# Patient Record
Sex: FEMALE | Race: OTHER | NOT HISPANIC OR LATINO
[De-identification: names, ages, dates, MRNs, and addresses within clinical notes are randomized per-mention and may not be internally consistent; named-entity substitution may affect disease eponyms.]

---

## 2020-06-16 PROBLEM — Z00.00 ENCOUNTER FOR PREVENTIVE HEALTH EXAMINATION: Status: ACTIVE | Noted: 2020-06-16

## 2021-04-01 ENCOUNTER — APPOINTMENT (OUTPATIENT)
Dept: PAIN MANAGEMENT | Facility: CLINIC | Age: 56
End: 2021-04-01
Payer: MEDICAID

## 2021-04-01 VITALS
DIASTOLIC BLOOD PRESSURE: 80 MMHG | HEIGHT: 72 IN | WEIGHT: 126 LBS | BODY MASS INDEX: 17.07 KG/M2 | SYSTOLIC BLOOD PRESSURE: 122 MMHG | TEMPERATURE: 98.2 F

## 2021-04-01 DIAGNOSIS — Z78.9 OTHER SPECIFIED HEALTH STATUS: ICD-10-CM

## 2021-04-01 PROCEDURE — 99244 OFF/OP CNSLTJ NEW/EST MOD 40: CPT

## 2021-04-01 PROCEDURE — 99072 ADDL SUPL MATRL&STAF TM PHE: CPT

## 2021-04-01 NOTE — CONSULT LETTER
[Dear  ___] : Dear  [unfilled], [Consult Letter:] : I had the pleasure of evaluating your patient, [unfilled]. [Please see my note below.] : Please see my note below. [Consult Closing:] : Thank you very much for allowing me to participate in the care of this patient.  If you have any questions, please do not hesitate to contact me. [Sincerely,] : Sincerely, [FreeTextEntry3] : Andrew Meza MD\par

## 2021-04-01 NOTE — PHYSICAL EXAM
[de-identified] : Constitutional: Well-developed, in no acute distress\par Eyes: Pupil- Right: normal, Left: normal\par Neck exam: Inspection normal\par Respiratory: Normal effort, speaking in full sentences\par Cardiovascular: Regular rate and rhythm\par Vascular: Dorsal pedis pulses normal and equal bilaterally\par Abdomen: Inspection normal, no distension\par Skin: Inspection normal, no bruising noted\par Musculoskeletal:\par Lumbar Spine:   Gait: Antalgic\par 		Inspection: Normal curvature, no abnormal kyphosis or scoliosis\par 		Facet loading: pain bilaterally\par 		Palpation:\par 			Lumbar and paraspinal muscles: pain bilaterally\par 			Sacroiliac joint: no pain\par 			Greater trochanter: no pain\par 		Muscle Strength:\par 		Iliopsoas: 5/5 bilaterally\par 		Quadriceps: 5/5 bilaterally\par 		Hamstrings: 5/5 bilaterally\par 		Tibialis anterior: 5/5 bilaterally\par 		Extensor hallucis longus: 5/5 bilaterally\par \par 		Sensation: normal and equal in bilateral lower extremities\par \par 		Reflexes:\par 			Patellar reflex: 2+ bilaterally\par 			Ankle jerk reflex: 2+ bilaterally\par 		\par 		Straight leg raise: negative bilaterally\par \par Extremity: no edema noted\par Neurological: Memory normal, AAO x 3, Cranial nerves II - XII grossly normal\par Psychiatric: Appropriate mood and affect, oriented to time, place, person, and situation\par \par \par

## 2021-04-01 NOTE — HISTORY OF PRESENT ILLNESS
[___ yrs] : [unfilled] year(s) ago [Constant] : constant [10] : a maximum pain level of 10/10 [Dull] : dull [Throbbing] : throbbing [Burning] : burning [Shooting] : shooting [Rest] : rest [FreeTextEntry1] : 56 yof presents w/ three  years of right low back pain which is worsening. She initially thought it was her hip. Now she has pain radiating down her right leg posteriorly to the foot. Left leg is wnl. Has right-sided low back pain. Was told that she needed hip surgery.  [FreeTextEntry7] : Right leg pain  [de-identified] : cramps [FreeTextEntry3] : n/a [FreeTextEntry4] : n

## 2021-04-01 NOTE — ASSESSMENT
[FreeTextEntry1] : 56 yof presents w/ three  years of right low back pain which is worsening. She initially thought it was her hip. Now she has pain radiating down her right leg posteriorly to the foot. Left leg is wnl. Has right-sided low back pain. Was told that she needed hip surgery. The patient has failed to have relief with over six weeks of physical therapy within the last three months and all medications. GIven their failure to improve with all other conservative measures recommend MRI lumbar spine. Patient will return to review imaging and plan for potential intervention.\par

## 2021-04-15 ENCOUNTER — RESULT REVIEW (OUTPATIENT)
Age: 56
End: 2021-04-15

## 2021-04-21 ENCOUNTER — APPOINTMENT (OUTPATIENT)
Dept: PAIN MANAGEMENT | Facility: CLINIC | Age: 56
End: 2021-04-21
Payer: MEDICAID

## 2021-04-21 VITALS
TEMPERATURE: 98 F | WEIGHT: 126 LBS | SYSTOLIC BLOOD PRESSURE: 126 MMHG | HEIGHT: 72 IN | DIASTOLIC BLOOD PRESSURE: 80 MMHG | BODY MASS INDEX: 17.07 KG/M2

## 2021-04-21 PROCEDURE — 99072 ADDL SUPL MATRL&STAF TM PHE: CPT

## 2021-04-21 PROCEDURE — 99214 OFFICE O/P EST MOD 30 MIN: CPT

## 2021-04-21 NOTE — DATA REVIEWED
[FreeTextEntry1] : \par  [place] MRI Report             Final\par \par No Documents Attached\par \par \par \par \par   [facility]\par                                          [address]\par                                    [place], [place]  [address]\par                                        Department of Radiology\par                                             [phone]\par \par \par Patient Name:      CHARMAINE REZA                Location:       [facility]\par Med Rec #:        [id number]                    Account #:      [id number]\par YOB: 1965                    Ordering:       Andrew Meza MD\par Age: 56               Sex:    F                 Attending:      Andrew Meza MD\par PCP:        NO PRIMARY CARE PHYSICIAN\par ______________________________________________________________________________________\par \par Exam Date:      04/15/21\par Exam:         MRI LUMBAR SPINE\par Order#:       [id number]\par \par \par \par HISTORY: 56 years Female LUMBAR RADICULOPATHY MRI\par \par \par  PROCEDURE:MRI lumbar spine without contrast\par \par  COMPARISON:None\par \par  TECHNIQUE:MRI lumbosacral spine 1.5 Anne magnet\par \par  FINDINGS:\par \par  The paraspinal musculature including the psoas muscle, multifidus muscles, and immediate para axial\par soft tissues appear within range of normal without evidence of mass or collection.\par \par  There is a maintenance of the normal lumbar lordosis. There is fairly uniform marrow signal on all\par sequences.\par \par  The spinal canal is patent without detectable intradural or extradural mass or collection.\par \par  At L5-S1\par  The disc appears intact. There is no significant central or foraminal stenosis. Bony productive\par changes associated with facet arthropathy are present.\par \par  At L4-L5\par  There is a left foraminal disc protrusion contacting the exiting left L4 nerve root without\par evidence of nerve root compression. There is mass effect on the traversing left L5 nerve root. Mild\par multifactorial central canal stenosis is predominantly secondary to ligamentum flavum\par thickening/buckling and bony overgrowth associated with facet arthropathy.\par \par  At L3-L4\par  There is mild multifactorial central canal stenosis predominantly secondary to bony overgrowth of\par the adjacent facet joints and ligamentum flavum thickening/buckling. Circumferential bulging of the\par outer annular fibers is present with a shallow central bulge\par \par  At L2-L3\par  There is mild disc desiccation. No focal disc protrusion is evident. Bony productive changes\par associated with facet arthropathy contributes to mild central canal stenosis.\par \par  At L1-L2\par  The disc appears intact. There is no significant central or foraminal stenosis.\par \par \par \par \par  IMPRESSION:\par \par  Left foraminal disc protrusion at L4-L5\par \par  Broad-based disc bulging at L3-L4 mild in degree\par \par  Bony productive changes and thickening of the ligamentum flavum contributes to varying degrees of\par central canal stenosis most notable at L4-5 where there is mild to moderate central canal stenosis\par and at other levels where there is mild central canal stenosis as further detailed above.

## 2021-04-21 NOTE — ASSESSMENT
[FreeTextEntry1] : 56 yof presents w/ three  years of right low back pain which is worsening. Now she has pain radiating down her right leg posteriorly to the foot. Left leg is wnl. Has right-sided low back pain. I have personally reviewed the patient's MRI in detail and discussed it with them which is significant for some nerve root impingement. Medrol dose karthik for acute pain before her travels. If no relief upon returning plan for LEATHA. \par

## 2021-04-21 NOTE — PHYSICAL EXAM
[de-identified] : Constitutional: Well-developed, in no acute distress\par \par Skin: Inspection normal, no bruising noted\par Musculoskeletal:\par Lumbar Spine:   Gait: Antalgic\par 		Inspection: Normal curvature, no abnormal kyphosis or scoliosis\par 		Facet loading: pain bilaterally\par 		Palpation:\par 			Lumbar and paraspinal muscles: pain bilaterally\par 			Sacroiliac joint: no pain\par 			Greater trochanter: no pain\par 		Muscle Strength:\par 		Iliopsoas: 5/5 bilaterally\par 		Quadriceps: 5/5 bilaterally\par 		Hamstrings: 5/5 bilaterally\par 		Tibialis anterior: 5/5 bilaterally\par 		Extensor hallucis longus: 5/5 bilaterally\par \par 		Sensation: normal and equal in bilateral lower extremities\par \par 		Reflexes:\par 			Patellar reflex: 2+ bilaterally\par 			Ankle jerk reflex: 2+ bilaterally\par 		\par 		Straight leg raise: negative bilaterally\par \par Extremity: no edema noted\par Neurological: Memory normal, AAO x 3, Cranial nerves II - XII grossly normal\par Psychiatric: Appropriate mood and affect, oriented to time, place, person, and situation\par \par \par

## 2021-05-20 ENCOUNTER — APPOINTMENT (OUTPATIENT)
Dept: PAIN MANAGEMENT | Facility: CLINIC | Age: 56
End: 2021-05-20
Payer: MEDICAID

## 2021-05-20 PROCEDURE — 99443: CPT

## 2021-06-01 ENCOUNTER — APPOINTMENT (OUTPATIENT)
Dept: PAIN MANAGEMENT | Facility: HOSPITAL | Age: 56
End: 2021-06-01

## 2021-06-01 ENCOUNTER — RESULT REVIEW (OUTPATIENT)
Age: 56
End: 2021-06-01

## 2021-10-29 ENCOUNTER — APPOINTMENT (OUTPATIENT)
Dept: PAIN MANAGEMENT | Facility: CLINIC | Age: 56
End: 2021-10-29
Payer: MEDICAID

## 2021-10-29 DIAGNOSIS — Z87.39 PERSONAL HISTORY OF OTHER DISEASES OF THE MUSCULOSKELETAL SYSTEM AND CONNECTIVE TISSUE: ICD-10-CM

## 2021-10-29 PROCEDURE — 99214 OFFICE O/P EST MOD 30 MIN: CPT

## 2021-10-29 PROCEDURE — 99072 ADDL SUPL MATRL&STAF TM PHE: CPT

## 2021-10-29 NOTE — DATA REVIEWED
[FreeTextEntry1] : 04/15/21\par Exam:         MRI LUMBAR SPINE\par \par  The paraspinal musculature including the psoas muscle, multifidus muscles, and immediate para axial\par soft tissues appear within range of normal without evidence of mass or collection.\par \par  There is a maintenance of the normal lumbar lordosis. There is fairly uniform marrow signal on all\par sequences.\par \par  The spinal canal is patent without detectable intradural or extradural mass or collection.\par \par  At L5-S1\par  The disc appears intact. There is no significant central or foraminal stenosis. Bony productive\par changes associated with facet arthropathy are present.\par \par  At L4-L5\par  There is a left foraminal disc protrusion contacting the exiting left L4 nerve root without\par evidence of nerve root compression. There is mass effect on the traversing left L5 nerve root. Mild\par multifactorial central canal stenosis is predominantly secondary to ligamentum flavum\par thickening/buckling and bony overgrowth associated with facet arthropathy.\par \par  At L3-L4\par  There is mild multifactorial central canal stenosis predominantly secondary to bony overgrowth of\par the adjacent facet joints and ligamentum flavum thickening/buckling. Circumferential bulging of the\par outer annular fibers is present with a shallow central bulge\par \par  At L2-L3\par  There is mild disc desiccation. No focal disc protrusion is evident. Bony productive changes\par associated with facet arthropathy contributes to mild central canal stenosis.\par \par  At L1-L2\par  The disc appears intact. There is no significant central or foraminal stenosis.\par \par \par \par \par  IMPRESSION:\par \par  Left foraminal disc protrusion at L4-L5\par \par  Broad-based disc bulging at L3-L4 mild in degree\par \par  Bony productive changes and thickening of the ligamentum flavum contributes to varying degrees of\par central canal stenosis most notable at L4-5 where there is mild to moderate central canal stenosis\par and at other levels where there is mild central canal stenosis as further detailed above.

## 2021-10-29 NOTE — HISTORY OF PRESENT ILLNESS
[___ yrs] : [unfilled] year(s) ago [Constant] : constant [10] : a maximum pain level of 10/10 [Dull] : dull [Throbbing] : throbbing [Burning] : burning [Shooting] : shooting [Rest] : rest [10 (pain as bad as you can imagine)] : 1. What number best describes your pain on average in the past week? 10/10 pain [10 (completely interferes)] : 3. What number best describes how, during the past week, pain has interfered with your general activity? 10/10 pain [FreeTextEntry1] : Interval History:\par Patient returns for follow-up today. She had excellent relief of her back pain after her LEATHA however she still has severe pain down her right leg. Reports weakness. Left side is wnl. No other recent changes in health. Quality of life is impaired. There has been a severe exacerbation of the patient's chronic pain.\par \par HPI 04/01/21: 56 yof presents w/ three  years of right low back pain which is worsening. She initially thought it was her hip. Now she has pain radiating down her right leg posteriorly to the foot. Left leg is wnl. Has right-sided low back pain. Was told that she needed hip surgery. \par \par Interventions:\par L5-S1 interlaminar LEATHA (06/01/21): [FreeTextEntry7] : Right leg pain  [de-identified] : cramps [FreeTextEntry3] : n/a [FreeTextEntry2] : 30

## 2021-10-29 NOTE — ASSESSMENT
[FreeTextEntry1] : 56 yof presents w/ three  years of right low back pain which is worsening down the right leg.\par \par I have personally reviewed the patient's MRI in detail and discussed it with them which is significant for mild to moderate stenosis at L4-L5.\par \par The patient has failed to have relief with medication management and physical therapy. Given the patients failure to improve with all other conservative measures, recommend right L4-L5 and L5-S1 transforaminal epidural steroid injection under fluoroscopic guidance. The patient will follow-up with me in my office two weeks following intervention.\par \par I have discussed in detail with the patient that an interventional spine procedure is associated with potential risks. The procedure may include an injection of steroid and potentially other medications (local anesthetic and normal saline) into the epidural space or surrounding tissue of the spine. There are significant risks of this procedure which include and are not limited to infection, bleeding, worsening pain, dural puncture leading to post-dural puncture headache, nerve damage, spinal cord injury, paralysis, stroke, and death. There is a chance that the procedure does not improve their pain. There are risks associated with the steroid being absorbed into the body systemically. These include dysphoria, difficulty sleeping, mood swings, and personality changes. Pre-menopausal women may notice a regularity his in her menstrual cycle for 2-3 months following the injection. Steroids can specifically affect patients with hypertension, diabetes, and peptic ulcers. The procedure may cause a temporary increase in blood pressure and blood glucose, and may adversely affect a peptic ulcer. Other, more rare complications, including avascular necrosis of the joints, glaucoma, and osteoporosis. I have discussed the risks of the procedure at length with the patient, and the potential benefits of pain relief. I have offered alternatives to the procedure. All questions were answered. The patient expressed understanding and wishes to proceed with the procedure.\par \par If no relief plan for right hip injection.\par \par Acetaminophen 1,000 mg q8h prn.\par \par Physical therapy - increase ROM, strengthening, postural training, other modalities ad farheen\par \par \par

## 2021-10-29 NOTE — PHYSICAL EXAM
[de-identified] : Constitutional: Well-developed, in no acute distress\par \par Skin: Inspection normal, no bruising noted\par Musculoskeletal:\par Lumbar Spine:   Gait: Antalgic\par 		Inspection: Normal curvature, no abnormal kyphosis or scoliosis\par 		Facet loading: pain bilaterally\par 		Palpation:\par 			Lumbar and paraspinal muscles: pain bilaterally\par 			Sacroiliac joint: no pain\par 			Greater trochanter: no pain\par 		Muscle Strength:\par 		Iliopsoas: 5/5 bilaterally\par 		Quadriceps: 5/5 bilaterally\par 		Hamstrings: 5/5 bilaterally\par 		Tibialis anterior: 5/5 bilaterally\par 		Extensor hallucis longus: 5/5 bilaterally\par \par 		Sensation: normal and equal in bilateral lower extremities\par \par 		Reflexes:\par 			Patellar reflex: 2+ bilaterally\par 			Ankle jerk reflex: 2+ bilaterally\par 		\par 		Straight leg raise: negative bilaterally\par \par Extremity: no edema noted\par Neurological: Memory normal, AAO x 3, Cranial nerves II - XII grossly normal\par Psychiatric: Appropriate mood and affect, oriented to time, place, person, and situation\par \par \par

## 2021-11-07 ENCOUNTER — RESULT REVIEW (OUTPATIENT)
Age: 56
End: 2021-11-07

## 2021-11-10 ENCOUNTER — RESULT REVIEW (OUTPATIENT)
Age: 56
End: 2021-11-10

## 2021-11-10 ENCOUNTER — APPOINTMENT (OUTPATIENT)
Dept: PAIN MANAGEMENT | Facility: HOSPITAL | Age: 56
End: 2021-11-10

## 2022-06-03 ENCOUNTER — APPOINTMENT (OUTPATIENT)
Dept: PAIN MANAGEMENT | Facility: CLINIC | Age: 57
End: 2022-06-03
Payer: MEDICAID

## 2022-06-03 PROCEDURE — 99214 OFFICE O/P EST MOD 30 MIN: CPT

## 2022-06-03 NOTE — ASSESSMENT
[FreeTextEntry1] : 56 yof presents w/ right low back pain which is worsening down the right leg. She has had 90% relief of pain from epidurals previously but pain is returning.\par \par I have personally reviewed the patient's MRI in detail and discussed it with them which is significant for mild to moderate stenosis at L4-L5.\par \par The patient has failed to have relief with medication management and physical therapy. Given the patients failure to improve with all other conservative measures, recommend right L4-L5 and L5-S1 transforaminal epidural steroid injection under fluoroscopic guidance. The patient will follow-up with me in my office two weeks following intervention.\par \par I have discussed in detail with the patient that an interventional spine procedure is associated with potential risks. The procedure may include an injection of steroid and potentially other medications (local anesthetic and normal saline) into the epidural space or surrounding tissue of the spine. There are significant risks of this procedure which include and are not limited to infection, bleeding, worsening pain, dural puncture leading to post-dural puncture headache, nerve damage, spinal cord injury, paralysis, stroke, and death. There is a chance that the procedure does not improve their pain. There are risks associated with the steroid being absorbed into the body systemically. These include dysphoria, difficulty sleeping, mood swings, and personality changes. Pre-menopausal women may notice a regularity his in her menstrual cycle for 2-3 months following the injection. Steroids can specifically affect patients with hypertension, diabetes, and peptic ulcers. The procedure may cause a temporary increase in blood pressure and blood glucose, and may adversely affect a peptic ulcer. Other, more rare complications, including avascular necrosis of the joints, glaucoma, and osteoporosis. I have discussed the risks of the procedure at length with the patient, and the potential benefits of pain relief. I have offered alternatives to the procedure. All questions were answered. The patient expressed understanding and wishes to proceed with the procedure.\par \par Physical therapy prescribed - goal will be to increase ROM, strengthening, postural training, other modalities ad farheen which may include massage and stim. Goals of therapy discussed with the patient in detail and will be discussed with physical therapist. Patient will follow-up following course of physical therapy to monitor progress and adjust therapy as needed.\par \par Acetaminophen 1,000 mg q8h prn for moderate pain. Risks, benefits, and alternatives of acetaminophen discussed with patient.\par \par Ibuprofen 600 mg q8h prn add when pain is not adequately controlled with acetaminophen. Risks, benefits, and alternatives of ibuprofen discussed with patient.\par \par Diet and nutritional strategies discussed which may improve patients pain and will improve overall health.\par \par \par \par

## 2022-06-03 NOTE — HISTORY OF PRESENT ILLNESS
[___ yrs] : [unfilled] year(s) ago [Constant] : constant [10] : a maximum pain level of 10/10 [Dull] : dull [Throbbing] : throbbing [Burning] : burning [Shooting] : shooting [Rest] : rest [10 (pain as bad as you can imagine)] : 1. What number best describes your pain on average in the past week? 10/10 pain [10 (completely interferes)] : 3. What number best describes how, during the past week, pain has interfered with your general activity? 10/10 pain [FreeTextEntry1] : Interval History:\par Patient returns for follow-up today. She had excellent relief of her back pain after her LEATHA however pain has returned. Reports weakness. Left side is wnl. No other recent changes in health. Quality of life is impaired. There has been a severe exacerbation of the patient's chronic pain. She is leaving for [place] [facility] [place] soon.\par \par HPI 04/01/21: 56 yof presents w/ three  years of right low back pain which is worsening. She initially thought it was her hip. Now she has pain radiating down her right leg posteriorly to the foot. Left leg is wnl. Has right-sided low back pain. Was told that she needed hip surgery. \par \par Interventions:\par Right L4-L5 and L5-S1 TFESI (11/10/21):\par L5-S1 interlaminar LEATHA (06/01/21): [FreeTextEntry7] : Right leg pain  [de-identified] : cramps [FreeTextEntry3] : n/a [FreeTextEntry2] : 30

## 2022-06-03 NOTE — PHYSICAL EXAM
[de-identified] : Constitutional: Well-developed, in no acute distress\par \par Skin: Inspection normal, no bruising noted\par Musculoskeletal:\par Lumbar Spine:   Gait: Antalgic\par 		Inspection: Normal curvature, no abnormal kyphosis or scoliosis\par 		Facet loading: pain bilaterally\par 		Palpation:\par 			Lumbar and paraspinal muscles: pain bilaterally\par 			Sacroiliac joint: no pain\par 			Greater trochanter: no pain\par 		Muscle Strength:\par 		Iliopsoas: 5/5 bilaterally\par 		Quadriceps: 5/5 bilaterally\par 		Hamstrings: 5/5 bilaterally\par 		Tibialis anterior: 5/5 bilaterally\par 		Extensor hallucis longus: 5/5 bilaterally\par \par 		Sensation: normal and equal in bilateral lower extremities\par \par 		Reflexes:\par 			Patellar reflex: 2+ bilaterally\par 			Ankle jerk reflex: 2+ bilaterally\par 		\par 		Straight leg raise: negative bilaterally\par \par Extremity: no edema noted\par Neurological: Memory normal, AAO x 3, Cranial nerves II - XII grossly normal\par Psychiatric: Appropriate mood and affect, oriented to time, place, person, and situation\par \par \par

## 2022-06-08 ENCOUNTER — APPOINTMENT (OUTPATIENT)
Dept: PAIN MANAGEMENT | Facility: HOSPITAL | Age: 57
End: 2022-06-08

## 2022-06-08 ENCOUNTER — TRANSCRIPTION ENCOUNTER (OUTPATIENT)
Age: 57
End: 2022-06-08

## 2022-06-08 ENCOUNTER — RESULT REVIEW (OUTPATIENT)
Age: 57
End: 2022-06-08

## 2022-10-19 ENCOUNTER — APPOINTMENT (OUTPATIENT)
Dept: PAIN MANAGEMENT | Facility: CLINIC | Age: 57
End: 2022-10-19

## 2022-10-19 VITALS
DIASTOLIC BLOOD PRESSURE: 78 MMHG | HEIGHT: 72 IN | SYSTOLIC BLOOD PRESSURE: 120 MMHG | TEMPERATURE: 98 F | BODY MASS INDEX: 18.96 KG/M2 | WEIGHT: 140 LBS

## 2022-10-19 DIAGNOSIS — M47.817 SPONDYLOSIS W/OUT MYELOPATHY OR RADICULOPATHY, LUMBOSACRAL REGION: ICD-10-CM

## 2022-10-19 DIAGNOSIS — M54.16 RADICULOPATHY, LUMBAR REGION: ICD-10-CM

## 2022-10-19 DIAGNOSIS — R10.30 LOWER ABDOMINAL PAIN, UNSPECIFIED: ICD-10-CM

## 2022-10-19 DIAGNOSIS — M79.10 MYALGIA, UNSPECIFIED SITE: ICD-10-CM

## 2022-10-19 PROCEDURE — 99214 OFFICE O/P EST MOD 30 MIN: CPT

## 2022-10-19 RX ORDER — LUBIPROSTONE 24 UG/1
24 CAPSULE ORAL
Qty: 60 | Refills: 0 | Status: ACTIVE | COMMUNITY
Start: 2022-10-17

## 2022-10-19 NOTE — ASSESSMENT
[FreeTextEntry1] : 56 yof presents w/ right low back pain which is worsening down the right leg. She has had 90% relief of pain from epidurals previously but pain is returning.\par \par I have personally reviewed the patient's MRI in detail and discussed it with them which is significant for mild to moderate stenosis at L4-L5.\par \par CT abdomen and pelvis to evaluate right lower quadrant pain.\par \par We will consider spine surgery evaluation.\par \par Physical therapy prescribed - goal will be to increase ROM, strengthening, postural training, other modalities ad farheen which may include massage and stim. Goals of therapy discussed with the patient in detail and will be discussed with physical therapist. Patient will follow-up following course of physical therapy to monitor progress and adjust therapy as needed.\par \par Acetaminophen 1,000 mg q8h prn for moderate pain. Risks, benefits, and alternatives of acetaminophen discussed with patient.\par \par Ibuprofen 600 mg q8h prn add when pain is not adequately controlled with acetaminophen. Risks, benefits, and alternatives of ibuprofen discussed with patient.\par \par Diet and nutritional strategies discussed which may improve patients pain and will improve overall health.\par

## 2022-10-19 NOTE — PHYSICAL EXAM
[de-identified] : Constitutional: Well-developed, in no acute distress\par \par Skin: Inspection normal, no bruising noted\par Musculoskeletal:\par Lumbar Spine:   Gait: Antalgic\par 		Inspection: Normal curvature, no abnormal kyphosis or scoliosis\par 		Facet loading: pain bilaterally\par 		Palpation:\par 			Lumbar and paraspinal muscles: pain bilaterally\par 			Sacroiliac joint: no pain\par 			Greater trochanter: no pain\par 		Muscle Strength:\par 		Iliopsoas: 5/5 bilaterally\par 		Quadriceps: 5/5 bilaterally\par 		Hamstrings: 5/5 bilaterally\par 		Tibialis anterior: 5/5 bilaterally\par 		Extensor hallucis longus: 5/5 bilaterally\par \par 		Sensation: normal and equal in bilateral lower extremities\par \par 		Reflexes:\par 			Patellar reflex: 2+ bilaterally\par 			Ankle jerk reflex: 2+ bilaterally\par 		\par 		Straight leg raise: negative bilaterally\par \par Extremity: no edema noted\par Neurological: Memory normal, AAO x 3, Cranial nerves II - XII grossly normal\par Psychiatric: Appropriate mood and affect, oriented to time, place, person, and situation\par \par \par

## 2022-10-19 NOTE — HISTORY OF PRESENT ILLNESS
[10 (pain as bad as you can imagine)] : 1. What number best describes your pain on average in the past week? 10/10 pain [10 (completely interferes)] : 3. What number best describes how, during the past week, pain has interfered with your general activity? 10/10 pain [___ yrs] : [unfilled] year(s) ago [Constant] : constant [10] : a maximum pain level of 10/10 [Dull] : dull [Throbbing] : throbbing [Burning] : burning [Shooting] : shooting [Rest] : rest [FreeTextEntry1] : Interval History:\par Patient returns for follow-up today. She had excellent relief of her back pain after her LEATHA however pain has returned. She has had developing right lower quadrant abdominal pain. Quality of life is impaired. There has been a severe exacerbation of the patient's chronic pain. Her pain is severe. \par \par HPI 04/01/21: 56 yof presents w/ three  years of right low back pain which is worsening. She initially thought it was her hip. Now she has pain radiating down her right leg posteriorly to the foot. Left leg is wnl. Has right-sided low back pain. Was told that she needed hip surgery. \par \par Interventions:\par Right L4-L5 and L5-S1 TFESI (11/10/21):\par L5-S1 interlaminar LEATHA (06/01/21): [FreeTextEntry2] : 30 [FreeTextEntry7] : Right leg pain  [de-identified] : cramps [FreeTextEntry3] : n/a

## 2022-10-31 ENCOUNTER — APPOINTMENT (OUTPATIENT)
Dept: ORTHOPEDIC SURGERY | Facility: CLINIC | Age: 57
End: 2022-10-31

## 2023-04-20 ENCOUNTER — APPOINTMENT (OUTPATIENT)
Dept: SURGERY | Facility: CLINIC | Age: 58
End: 2023-04-20
Payer: MEDICAID

## 2023-04-20 VITALS
BODY MASS INDEX: 19.23 KG/M2 | HEIGHT: 72 IN | WEIGHT: 142 LBS | TEMPERATURE: 98.5 F | DIASTOLIC BLOOD PRESSURE: 75 MMHG | SYSTOLIC BLOOD PRESSURE: 120 MMHG | HEART RATE: 62 BPM

## 2023-04-20 DIAGNOSIS — R10.31 RIGHT LOWER QUADRANT PAIN: ICD-10-CM

## 2023-04-20 PROCEDURE — 99203 OFFICE O/P NEW LOW 30 MIN: CPT

## 2023-04-20 RX ORDER — METHYLPREDNISOLONE 4 MG/1
4 TABLET ORAL
Qty: 1 | Refills: 0 | Status: DISCONTINUED | COMMUNITY
Start: 2021-04-21 | End: 2023-04-20

## 2023-04-20 RX ORDER — GABAPENTIN 100 MG
100 TABLET ORAL
Refills: 0 | Status: ACTIVE | COMMUNITY

## 2023-04-20 RX ORDER — POLYETHYLENE GLYCOL-3350 AND ELECTROLYTES 236; 6.74; 5.86; 2.97; 22.74 G/274.31G; G/274.31G; G/274.31G; G/274.31G; G/274.31G
236 POWDER, FOR SOLUTION ORAL
Qty: 4000 | Refills: 0 | Status: DISCONTINUED | COMMUNITY
Start: 2022-05-11 | End: 2023-04-20

## 2023-04-20 RX ORDER — AMOXICILLIN 250 MG/1
250 CAPSULE ORAL
Qty: 30 | Refills: 0 | Status: DISCONTINUED | COMMUNITY
Start: 2022-07-07 | End: 2023-04-20

## 2023-04-20 RX ORDER — METHYLPREDNISOLONE 4 MG/1
4 TABLET ORAL
Qty: 1 | Refills: 0 | Status: DISCONTINUED | COMMUNITY
Start: 2021-05-20 | End: 2023-04-20

## 2023-04-20 NOTE — PHYSICAL EXAM
[Respiratory Effort] : normal respiratory effort [Normal Rate and Rhythm] : normal rate and rhythm [No Rash or Lesion] : No rash or lesion [Alert] : alert [Calm] : calm [de-identified] : soft, NT/ND, well healed incisions, right sided soft tissue swelling at abdominoplasty scar, difficult to asses for hernia

## 2023-04-20 NOTE — CONSULT LETTER
[Dear  ___] : Dear  [unfilled], [( Thank you for referring [unfilled] for consultation for _____ )] : Thank you for referring [unfilled] for consultation for [unfilled] [Please see my note below.] : Please see my note below. [Consult Closing:] : Thank you very much for allowing me to participate in the care of this patient.  If you have any questions, please do not hesitate to contact me. [Sincerely,] : Sincerely, [FreeTextEntry3] : Nissa Barksdale

## 2023-04-20 NOTE — HISTORY OF PRESENT ILLNESS
[de-identified] : 57 yo F presenting with right groin discomfort [de-identified] : + right groin bulge and pain.  \par The pain extends from her lower abdomen to right groin and inner thigh\par feels a pulling sensation.\par bulge improves at night and when lying down

## 2023-05-01 ENCOUNTER — RESULT REVIEW (OUTPATIENT)
Age: 58
End: 2023-05-01

## 2023-05-10 ENCOUNTER — RESULT REVIEW (OUTPATIENT)
Age: 58
End: 2023-05-10

## 2023-07-12 ENCOUNTER — TRANSCRIPTION ENCOUNTER (OUTPATIENT)
Age: 58
End: 2023-07-12

## 2024-04-24 ENCOUNTER — TRANSCRIPTION ENCOUNTER (OUTPATIENT)
Age: 59
End: 2024-04-24

## 2025-03-29 NOTE — HISTORY OF PRESENT ILLNESS
03/29/25 0750   C-SSRS (Frequent Screen)   2. Have you actually had any thoughts of killing yourself? No   6. Have you done anything, started to do anything, or prepared to do anything to end your life? No   Suicide Evaluation Negative Screen - White     Nursing Suicide Assessment Note - Inpatient    Current assessment:    Current C-SSRS score: (P) Negative Screen - White      Protective Factors / Reason for Living: Congregation beliefs, Future orientation, Social supports, Responsibility to pets    Interventions:  Safety maintained, continue to monitor per protocol.      Other Interventions Implemented:  Visual inspection of patient's environment completed. Items removed: none    [10 (pain as bad as you can imagine)] : 1. What number best describes your pain on average in the past week? 10/10 pain [10 (completely interferes)] : 3. What number best describes how, during the past week, pain has interfered with your general activity? 10/10 pain [___ yrs] : [unfilled] year(s) ago [Constant] : constant [10] : a maximum pain level of 10/10 [Dull] : dull [Throbbing] : throbbing [Burning] : burning [Shooting] : shooting [Rest] : rest [FreeTextEntry1] : Interval History:\par Patient returns for follow-up today. She returns to review recent MRI lumbar spine. I have personally reviewed the patient's MRI in detail and discussed it with them which is significant for some nerve root impingement. There has been a severe exacerbation of the patient's chronic pain. She is traveling to [facility] shortly for a mission trip. No relief w/ NSAIDS.\par \par HPI 04/01/21: 56 yof presents w/ three  years of right low back pain which is worsening. She initially thought it was her hip. Now she has pain radiating down her right leg posteriorly to the foot. Left leg is wnl. Has right-sided low back pain. Was told that she needed hip surgery.  [FreeTextEntry2] : 28 [FreeTextEntry7] : Right leg pain  [de-identified] : cramps [FreeTextEntry3] : n/a